# Patient Record
Sex: FEMALE | Race: WHITE | NOT HISPANIC OR LATINO | URBAN - METROPOLITAN AREA
[De-identification: names, ages, dates, MRNs, and addresses within clinical notes are randomized per-mention and may not be internally consistent; named-entity substitution may affect disease eponyms.]

---

## 2024-02-15 ENCOUNTER — CLINICAL SUPPORT (OUTPATIENT)
Dept: URGENT CARE | Facility: CLINIC | Age: 39
End: 2024-02-15
Payer: COMMERCIAL

## 2024-02-15 VITALS
SYSTOLIC BLOOD PRESSURE: 125 MMHG | WEIGHT: 148 LBS | TEMPERATURE: 98 F | RESPIRATION RATE: 16 BRPM | HEART RATE: 84 BPM | BODY MASS INDEX: 26.22 KG/M2 | OXYGEN SATURATION: 98 % | DIASTOLIC BLOOD PRESSURE: 86 MMHG

## 2024-02-15 DIAGNOSIS — R42 VERTIGO: Primary | ICD-10-CM

## 2024-02-15 PROCEDURE — 99203 OFFICE O/P NEW LOW 30 MIN: CPT

## 2024-02-15 ASSESSMENT — ENCOUNTER SYMPTOMS
LIGHT-HEADEDNESS: 0
RHINORRHEA: 0
DIZZINESS: 1
PHOTOPHOBIA: 0
WEAKNESS: 0
SHORTNESS OF BREATH: 0
HEADACHES: 0
EYE PAIN: 0

## 2024-02-15 ASSESSMENT — PAIN SCALES - GENERAL: PAINLEVEL: 3

## 2024-02-15 NOTE — PROGRESS NOTES
Subjective   Patient ID: SRUTHI King is a 38 y.o. female.      Earache   Pertinent negatives include no headaches or rhinorrhea.     Patient presents for complaints of dizziness that started Tuesday evening around 9pm after getting up from a sitting position. She states that she took Benadryl with some relief of symptoms. She denies any headaches or vision changes at this time. She does have a history of migraines. She denies any vomiting but has some occasional nausea.       Review of Systems   HENT:  Positive for ear pain. Negative for congestion, rhinorrhea and tinnitus.    Eyes:  Negative for photophobia, pain and visual disturbance.   Respiratory:  Negative for shortness of breath.    Cardiovascular:  Negative for chest pain.   Neurological:  Positive for dizziness. Negative for weakness, light-headedness and headaches.     Objective   Physical Exam  Constitutional:       Appearance: Normal appearance.   HENT:      Head: Normocephalic and atraumatic.      Right Ear: Tympanic membrane, ear canal and external ear normal.      Left Ear: Tympanic membrane, ear canal and external ear normal.      Nose: Nose normal.      Mouth/Throat:      Mouth: Mucous membranes are moist.   Eyes:      Extraocular Movements: Extraocular movements intact.      Pupils: Pupils are equal, round, and reactive to light.   Cardiovascular:      Rate and Rhythm: Normal rate and regular rhythm.   Pulmonary:      Effort: Pulmonary effort is normal.      Breath sounds: Normal breath sounds.   Musculoskeletal:      Cervical back: Normal range of motion and neck supple.   Skin:     General: Skin is warm.      Capillary Refill: Capillary refill takes less than 2 seconds.   Neurological:      General: No focal deficit present.      Mental Status: She is alert and oriented to person, place, and time.      Cranial Nerves: Cranial nerves 2-12 are intact.      Sensory: Sensation is intact.      Motor: Motor function is intact.      Coordination:  Coordination is intact.       Discussed with patient the diagnosis of Vertigo. Sending over a prescription for Meclizine 12.5mg as needed for dizziness. Discussed development of headaches or changes in vision or mental status to proceed to the ER immediately. Also discussed following up with PCP for further workup if no improvement.   No neuro deficits at this time that would warrant immediate attention. Cranial nerves intact.  Assessment/Plan   Problem List Items Addressed This Visit             ICD-10-CM    Vertigo - Primary R42

## 2024-02-15 NOTE — PATIENT INSTRUCTIONS
Discussed with patient the diagnosis of Vertigo. Sending over a prescription for Meclizine 12.5mg as needed for dizziness. Discussed development of headaches or changes in vision or mental status to proceed to the ER immediately. Also discussed following up with PCP for further workup if no improvement.

## 2024-04-25 PROBLEM — U07.1 DISEASE DUE TO SEVERE ACUTE RESPIRATORY SYNDROME CORONAVIRUS 2 (SARS-COV-2): Status: ACTIVE | Noted: 2024-04-25

## 2024-04-25 PROBLEM — G54.0 BRACHIAL PLEXUS NEUROPATHY: Status: ACTIVE | Noted: 2021-08-01

## 2024-04-25 PROBLEM — G43.909 MIGRAINE HEADACHE: Status: ACTIVE | Noted: 2024-04-25

## 2024-04-25 PROBLEM — U07.1 COVID-19: Status: RESOLVED | Noted: 2024-04-25 | Resolved: 2024-04-25

## 2024-04-25 PROBLEM — J02.9 SORE THROAT: Status: ACTIVE | Noted: 2024-04-25

## 2024-04-25 PROBLEM — R51.9 HEADACHE: Status: ACTIVE | Noted: 2024-04-25

## 2024-04-25 PROBLEM — Z20.822 EXPOSURE TO SEVERE ACUTE RESPIRATORY SYNDROME CORONAVIRUS 2 (SARS-COV-2): Status: ACTIVE | Noted: 2024-04-25

## 2024-04-25 PROBLEM — B00.1 HERPES LABIALIS: Status: ACTIVE | Noted: 2022-12-13

## 2024-04-25 RX ORDER — VALACYCLOVIR HYDROCHLORIDE 500 MG/1
TABLET, FILM COATED ORAL
COMMUNITY
Start: 2023-11-03

## 2024-04-25 RX ORDER — ALBUTEROL SULFATE 90 UG/1
AEROSOL, METERED RESPIRATORY (INHALATION)
COMMUNITY
Start: 2020-10-27

## 2024-04-25 RX ORDER — TOPIRAMATE 25 MG/1
25 TABLET ORAL NIGHTLY
COMMUNITY
Start: 2023-11-25

## 2024-04-25 RX ORDER — METHOCARBAMOL 500 MG/1
500 TABLET, FILM COATED ORAL 3 TIMES DAILY
COMMUNITY
Start: 2021-06-04

## 2024-04-25 RX ORDER — TRETINOIN 0.8 MG/G
GEL TOPICAL
COMMUNITY
Start: 2021-01-05

## 2024-04-25 RX ORDER — ETONOGESTREL AND ETHINYL ESTRADIOL VAGINAL RING .015; .12 MG/D; MG/D
1 RING VAGINAL
COMMUNITY
Start: 2022-12-13

## 2024-04-25 RX ORDER — PHENTERMINE HYDROCHLORIDE 37.5 MG/1
TABLET ORAL
COMMUNITY
Start: 2023-09-16

## 2024-04-25 RX ORDER — MECLIZINE HCL 12.5 MG 12.5 MG/1
TABLET ORAL
COMMUNITY
Start: 2024-02-15

## 2024-05-02 ENCOUNTER — OFFICE VISIT (OUTPATIENT)
Dept: OTOLARYNGOLOGY | Facility: CLINIC | Age: 39
End: 2024-05-02
Payer: COMMERCIAL

## 2024-05-02 VITALS
BODY MASS INDEX: 28 KG/M2 | SYSTOLIC BLOOD PRESSURE: 130 MMHG | DIASTOLIC BLOOD PRESSURE: 89 MMHG | TEMPERATURE: 98.3 F | HEIGHT: 63 IN | OXYGEN SATURATION: 99 % | RESPIRATION RATE: 16 BRPM | HEART RATE: 60 BPM | WEIGHT: 158 LBS

## 2024-05-02 DIAGNOSIS — R26.89 IMBALANCE: Primary | ICD-10-CM

## 2024-05-02 DIAGNOSIS — H81.20 VESTIBULAR NEURONITIS, UNSPECIFIED LATERALITY: ICD-10-CM

## 2024-05-02 PROCEDURE — 1036F TOBACCO NON-USER: CPT | Performed by: STUDENT IN AN ORGANIZED HEALTH CARE EDUCATION/TRAINING PROGRAM

## 2024-05-02 PROCEDURE — 99213 OFFICE O/P EST LOW 20 MIN: CPT | Performed by: STUDENT IN AN ORGANIZED HEALTH CARE EDUCATION/TRAINING PROGRAM

## 2024-05-02 PROCEDURE — 99203 OFFICE O/P NEW LOW 30 MIN: CPT | Performed by: STUDENT IN AN ORGANIZED HEALTH CARE EDUCATION/TRAINING PROGRAM

## 2024-05-02 SDOH — ECONOMIC STABILITY: FOOD INSECURITY: WITHIN THE PAST 12 MONTHS, YOU WORRIED THAT YOUR FOOD WOULD RUN OUT BEFORE YOU GOT MONEY TO BUY MORE.: NEVER TRUE

## 2024-05-02 SDOH — ECONOMIC STABILITY: FOOD INSECURITY: WITHIN THE PAST 12 MONTHS, THE FOOD YOU BOUGHT JUST DIDN'T LAST AND YOU DIDN'T HAVE MONEY TO GET MORE.: NEVER TRUE

## 2024-05-02 ASSESSMENT — PAIN SCALES - GENERAL: PAINLEVEL: 0-NO PAIN

## 2024-05-02 ASSESSMENT — LIFESTYLE VARIABLES
AUDIT-C TOTAL SCORE: 1
HOW OFTEN DO YOU HAVE A DRINK CONTAINING ALCOHOL: MONTHLY OR LESS
SKIP TO QUESTIONS 9-10: 1
HOW MANY STANDARD DRINKS CONTAINING ALCOHOL DO YOU HAVE ON A TYPICAL DAY: 1 OR 2
HOW OFTEN DO YOU HAVE SIX OR MORE DRINKS ON ONE OCCASION: NEVER

## 2024-05-02 ASSESSMENT — COLUMBIA-SUICIDE SEVERITY RATING SCALE - C-SSRS
1. IN THE PAST MONTH, HAVE YOU WISHED YOU WERE DEAD OR WISHED YOU COULD GO TO SLEEP AND NOT WAKE UP?: NO
6. HAVE YOU EVER DONE ANYTHING, STARTED TO DO ANYTHING, OR PREPARED TO DO ANYTHING TO END YOUR LIFE?: NO
2. HAVE YOU ACTUALLY HAD ANY THOUGHTS OF KILLING YOURSELF?: NO

## 2024-05-02 ASSESSMENT — PATIENT HEALTH QUESTIONNAIRE - PHQ9
SUM OF ALL RESPONSES TO PHQ9 QUESTIONS 1 AND 2: 0
1. LITTLE INTEREST OR PLEASURE IN DOING THINGS: NOT AT ALL
2. FEELING DOWN, DEPRESSED OR HOPELESS: NOT AT ALL

## 2024-05-02 ASSESSMENT — ENCOUNTER SYMPTOMS
DEPRESSION: 0
OCCASIONAL FEELINGS OF UNSTEADINESS: 0
LOSS OF SENSATION IN FEET: 0

## 2024-05-02 NOTE — PROGRESS NOTES
SUBJECTIVE  Patient ID: SRUTHI King is a 38 y.o. female who presents for Dizziness.    She reports that roughly 2 months ago she had an episode of dizziness described as a feeling like she was on a ship. This started one night and got worse by the next day. Had some improvement with Benadryl. Was seen at an urgent care who prescribed meclizine. Lasted for 4-5 days. No preceding URI that she can recall. Might have been associated with some otalgia. They deny hearing loss, tinnitus, and otorrhea. They deny a history of prior ear surgery, noise exposure, exposure to ototoxic drugs or agents. Her son is going through a few procedures for ETD (currently 9 years old).    Patient also noted concern about snoring.    Review of Systems  Complete ROS negative except as noted above or on patient intake form and as above.    OBJECTIVE  Physical Exam  CONSTITUTIONAL: Well appearing female  who appears stated age.  PSYCHIATRIC: Alert, appropriate mood and affect.  RESPIRATORY: Normal inspiration and expiration and chest wall expansion; no use of accessory muscles to breathe.  VOICE: Clear speech without hoarseness. No stridor nor stertor.  HEAD AND FACE: Symmetric facial features. No cutaneous masses or lesions were visualized.  RIGHT EAR:  Normal external ear and post auricular area, no visible lesions, external auditory canal patent, tympanic membrane intact, no retraction, no signs of mass, effusion, or infection within the middle ear.  LEFT EAR: Normal external ear and post auricular area, no visible lesions, external auditory canal patent, tympanic membrane intact, no retraction, no signs of mass, effusion, or infection within the middle ear.   EYES: Pupils were equal in size and reactive to light. Extra-ocular muscle function was intact. No nystagmus was observed. Vision was grossly intact.  NEUROLOGIC: Cranial nerves III, IV, and VI were noted to be intact via extra-ocular muscle movement testing. Cranial nerve VII was  noted to be intact and symmetric by facial movement. Cranial nerve VIII was tested with normal voice examination and revealed grossly normal hearing.    ASSESSMENT/PLAN  Diagnoses and all orders for this visit:  Imbalance  Vestibular neuronitis, unspecified laterality      38 y.o. female patient is presenting presenting with isolated episode of vertigo consistent with vestibular neuritis.    1.  Suspected vestibular neuritis, isolated vertigo  The patient is presenting with an isolated episode of feeling of movement consistent with vertigo.  She reports that this lasted for 4 to 5 days and improved with meclizine as prescribed in urgent care.  I reviewed the urgent care note.  I reassured her of her normal otologic exam.     The patient's physical exam findings were discussed and reassurance was provided.  The balance system was discussed in detail, and the etiologies of imbalance and dizziness were explained.  We discussed that the patient's symptoms and above physical exam findings were suggestive of vestibular neuritis.  We discussed that this is typically isolated in nature but can have some residual side effects.  Thankfully, she is not experiencing any persistent imbalance.  We discussed that persistent imbalance would necessitate further evaluation with an audiogram; given that she is improved we can defer this at this time.  She denies any hearing changes.  We discussed that she can use meclizine or Benadryl as needed in the future as she had in the past if vertigo returns.  She should avoid taking these medications on a regular basis due to longterm side effects.    I strongly advised her to return to clinic for assessment and evaluation with audiogram if she begins having more episodes or persistent symptoms.    2.  Cerumen  Patient also has some questions about how to manage cerumen.  We discussed that typically the ear canals can clean himself.  Advised against placing anything in the ears.  We discussed  management of water in the ear canals.    3.  Snoring  The patient also briefly mention snoring.  Unfortunately, the patient was late for her appointment that we could not address this at this time.  I encouraged her to make a follow-up if she wishes to discuss this in her sleep in more detail.    This note was created using speech recognition transcription software. Despite proofreading, typographical errors may be present that affect the meaning of the content. Please contact my office with any questions.    Urgent care note reviewed. Last PCP note reviewed.

## 2024-05-02 NOTE — PATIENT INSTRUCTIONS
It sounds like you had an episode of vestibular neuritis. Unless you have further episodes you do not need further work-up.